# Patient Record
Sex: MALE | Race: WHITE | Employment: STUDENT | URBAN - METROPOLITAN AREA
[De-identification: names, ages, dates, MRNs, and addresses within clinical notes are randomized per-mention and may not be internally consistent; named-entity substitution may affect disease eponyms.]

---
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
## 2022-03-19 ENCOUNTER — APPOINTMENT (OUTPATIENT)
Dept: CT IMAGING | Age: 22
End: 2022-03-19
Payer: COMMERCIAL

## 2022-03-19 ENCOUNTER — HOSPITAL ENCOUNTER (OUTPATIENT)
Age: 22
Setting detail: OBSERVATION
Discharge: HOME | End: 2022-03-20
Attending: EMERGENCY MEDICINE | Admitting: SURGERY
Payer: COMMERCIAL

## 2022-03-19 ENCOUNTER — ANESTHESIA (OUTPATIENT)
Dept: OPERATING ROOM | Age: 22
End: 2022-03-19
Payer: COMMERCIAL

## 2022-03-19 ENCOUNTER — ANESTHESIA EVENT (OUTPATIENT)
Dept: OPERATING ROOM | Age: 22
End: 2022-03-19
Payer: COMMERCIAL

## 2022-03-19 VITALS — OXYGEN SATURATION: 100 % | DIASTOLIC BLOOD PRESSURE: 59 MMHG | SYSTOLIC BLOOD PRESSURE: 103 MMHG

## 2022-03-19 DIAGNOSIS — G89.18 POST-OPERATIVE PAIN: ICD-10-CM

## 2022-03-19 DIAGNOSIS — K37 APPENDICITIS, UNSPECIFIED APPENDICITIS TYPE: Primary | ICD-10-CM

## 2022-03-19 PROBLEM — K35.80 ACUTE APPENDICITIS: Status: ACTIVE | Noted: 2022-03-19

## 2022-03-19 LAB
ALBUMIN SERPL-MCNC: 4.5 G/DL (ref 3.5–5.2)
ALP BLD-CCNC: 56 U/L (ref 40–129)
ALT SERPL-CCNC: 15 U/L (ref 0–40)
ANION GAP SERPL CALCULATED.3IONS-SCNC: 11 MMOL/L (ref 7–16)
AST SERPL-CCNC: 22 U/L (ref 0–39)
BASOPHILS ABSOLUTE: 0.02 E9/L (ref 0–0.2)
BASOPHILS RELATIVE PERCENT: 0.2 % (ref 0–2)
BILIRUB SERPL-MCNC: 0.9 MG/DL (ref 0–1.2)
BILIRUBIN URINE: NEGATIVE
BLOOD, URINE: NEGATIVE
BUN BLDV-MCNC: 11 MG/DL (ref 6–20)
CALCIUM SERPL-MCNC: 9.3 MG/DL (ref 8.6–10.2)
CHLORIDE BLD-SCNC: 102 MMOL/L (ref 98–107)
CLARITY: CLEAR
CO2: 23 MMOL/L (ref 22–29)
COLOR: YELLOW
CREAT SERPL-MCNC: 0.7 MG/DL (ref 0.7–1.2)
EOSINOPHILS ABSOLUTE: 0.03 E9/L (ref 0.05–0.5)
EOSINOPHILS RELATIVE PERCENT: 0.3 % (ref 0–6)
GFR AFRICAN AMERICAN: >60
GFR NON-AFRICAN AMERICAN: >60 ML/MIN/1.73
GLUCOSE BLD-MCNC: 109 MG/DL (ref 74–99)
GLUCOSE URINE: NEGATIVE MG/DL
HCT VFR BLD CALC: 40.3 % (ref 37–54)
HEMOGLOBIN: 14.2 G/DL (ref 12.5–16.5)
IMMATURE GRANULOCYTES #: 0.04 E9/L
IMMATURE GRANULOCYTES %: 0.4 % (ref 0–5)
KETONES, URINE: NEGATIVE MG/DL
LACTIC ACID: 1.6 MMOL/L (ref 0.5–2.2)
LEUKOCYTE ESTERASE, URINE: NEGATIVE
LIPASE: 25 U/L (ref 13–60)
LYMPHOCYTES ABSOLUTE: 0.77 E9/L (ref 1.5–4)
LYMPHOCYTES RELATIVE PERCENT: 8.1 % (ref 20–42)
MCH RBC QN AUTO: 31.3 PG (ref 26–35)
MCHC RBC AUTO-ENTMCNC: 35.2 % (ref 32–34.5)
MCV RBC AUTO: 89 FL (ref 80–99.9)
MONOCYTES ABSOLUTE: 0.7 E9/L (ref 0.1–0.95)
MONOCYTES RELATIVE PERCENT: 7.3 % (ref 2–12)
NEUTROPHILS ABSOLUTE: 7.98 E9/L (ref 1.8–7.3)
NEUTROPHILS RELATIVE PERCENT: 83.7 % (ref 43–80)
NITRITE, URINE: NEGATIVE
PDW BLD-RTO: 11.6 FL (ref 11.5–15)
PH UA: 7.5 (ref 5–9)
PLATELET # BLD: 180 E9/L (ref 130–450)
PMV BLD AUTO: 10.6 FL (ref 7–12)
POTASSIUM SERPL-SCNC: 3.7 MMOL/L (ref 3.5–5)
PROTEIN UA: NEGATIVE MG/DL
RBC # BLD: 4.53 E12/L (ref 3.8–5.8)
SODIUM BLD-SCNC: 136 MMOL/L (ref 132–146)
SPECIFIC GRAVITY UA: 1.02 (ref 1–1.03)
TOTAL PROTEIN: 7.3 G/DL (ref 6.4–8.3)
UROBILINOGEN, URINE: 0.2 E.U./DL
WBC # BLD: 9.5 E9/L (ref 4.5–11.5)

## 2022-03-19 PROCEDURE — 96365 THER/PROPH/DIAG IV INF INIT: CPT

## 2022-03-19 PROCEDURE — 2500000003 HC RX 250 WO HCPCS: Performed by: NURSE ANESTHETIST, CERTIFIED REGISTERED

## 2022-03-19 PROCEDURE — 3700000000 HC ANESTHESIA ATTENDED CARE: Performed by: SURGERY

## 2022-03-19 PROCEDURE — 81003 URINALYSIS AUTO W/O SCOPE: CPT

## 2022-03-19 PROCEDURE — 6370000000 HC RX 637 (ALT 250 FOR IP): Performed by: SURGERY

## 2022-03-19 PROCEDURE — 2720000010 HC SURG SUPPLY STERILE: Performed by: SURGERY

## 2022-03-19 PROCEDURE — 7100000000 HC PACU RECOVERY - FIRST 15 MIN: Performed by: SURGERY

## 2022-03-19 PROCEDURE — 83690 ASSAY OF LIPASE: CPT

## 2022-03-19 PROCEDURE — 6360000002 HC RX W HCPCS: Performed by: NURSE ANESTHETIST, CERTIFIED REGISTERED

## 2022-03-19 PROCEDURE — 88304 TISSUE EXAM BY PATHOLOGIST: CPT

## 2022-03-19 PROCEDURE — 2580000003 HC RX 258: Performed by: NURSE ANESTHETIST, CERTIFIED REGISTERED

## 2022-03-19 PROCEDURE — 3600000014 HC SURGERY LEVEL 4 ADDTL 15MIN: Performed by: SURGERY

## 2022-03-19 PROCEDURE — 96375 TX/PRO/DX INJ NEW DRUG ADDON: CPT

## 2022-03-19 PROCEDURE — 74177 CT ABD & PELVIS W/CONTRAST: CPT

## 2022-03-19 PROCEDURE — 2500000003 HC RX 250 WO HCPCS: Performed by: PHYSICIAN ASSISTANT

## 2022-03-19 PROCEDURE — 7100000001 HC PACU RECOVERY - ADDTL 15 MIN: Performed by: SURGERY

## 2022-03-19 PROCEDURE — 3700000001 HC ADD 15 MINUTES (ANESTHESIA): Performed by: SURGERY

## 2022-03-19 PROCEDURE — 3600000004 HC SURGERY LEVEL 4 BASE: Performed by: SURGERY

## 2022-03-19 PROCEDURE — 2580000003 HC RX 258: Performed by: SURGERY

## 2022-03-19 PROCEDURE — 85025 COMPLETE CBC W/AUTO DIFF WBC: CPT

## 2022-03-19 PROCEDURE — 6360000002 HC RX W HCPCS: Performed by: PHYSICIAN ASSISTANT

## 2022-03-19 PROCEDURE — 6360000002 HC RX W HCPCS

## 2022-03-19 PROCEDURE — 80053 COMPREHEN METABOLIC PANEL: CPT

## 2022-03-19 PROCEDURE — 2709999900 HC NON-CHARGEABLE SUPPLY: Performed by: SURGERY

## 2022-03-19 PROCEDURE — G0378 HOSPITAL OBSERVATION PER HR: HCPCS

## 2022-03-19 PROCEDURE — 2580000003 HC RX 258: Performed by: PHYSICIAN ASSISTANT

## 2022-03-19 PROCEDURE — 99285 EMERGENCY DEPT VISIT HI MDM: CPT

## 2022-03-19 PROCEDURE — 2500000003 HC RX 250 WO HCPCS: Performed by: SURGERY

## 2022-03-19 PROCEDURE — 6360000004 HC RX CONTRAST MEDICATION: Performed by: RADIOLOGY

## 2022-03-19 PROCEDURE — 83605 ASSAY OF LACTIC ACID: CPT

## 2022-03-19 RX ORDER — 0.9 % SODIUM CHLORIDE 0.9 %
1000 INTRAVENOUS SOLUTION INTRAVENOUS ONCE
Status: COMPLETED | OUTPATIENT
Start: 2022-03-19 | End: 2022-03-19

## 2022-03-19 RX ORDER — LIDOCAINE HYDROCHLORIDE AND EPINEPHRINE 10; 10 MG/ML; UG/ML
INJECTION, SOLUTION INFILTRATION; PERINEURAL PRN
Status: DISCONTINUED | OUTPATIENT
Start: 2022-03-19 | End: 2022-03-19 | Stop reason: ALTCHOICE

## 2022-03-19 RX ORDER — DEXAMETHASONE SODIUM PHOSPHATE 4 MG/ML
INJECTION, SOLUTION INTRA-ARTICULAR; INTRALESIONAL; INTRAMUSCULAR; INTRAVENOUS; SOFT TISSUE PRN
Status: DISCONTINUED | OUTPATIENT
Start: 2022-03-19 | End: 2022-03-19 | Stop reason: SDUPTHER

## 2022-03-19 RX ORDER — SODIUM CHLORIDE 9 MG/ML
INJECTION, SOLUTION INTRAVENOUS CONTINUOUS PRN
Status: DISCONTINUED | OUTPATIENT
Start: 2022-03-19 | End: 2022-03-19 | Stop reason: SDUPTHER

## 2022-03-19 RX ORDER — LIDOCAINE HYDROCHLORIDE 20 MG/ML
INJECTION, SOLUTION EPIDURAL; INFILTRATION; INTRACAUDAL; PERINEURAL PRN
Status: DISCONTINUED | OUTPATIENT
Start: 2022-03-19 | End: 2022-03-19 | Stop reason: SDUPTHER

## 2022-03-19 RX ORDER — GLYCOPYRROLATE 1 MG/5 ML
SYRINGE (ML) INTRAVENOUS PRN
Status: DISCONTINUED | OUTPATIENT
Start: 2022-03-19 | End: 2022-03-19 | Stop reason: SDUPTHER

## 2022-03-19 RX ORDER — ONDANSETRON 2 MG/ML
4 INJECTION INTRAMUSCULAR; INTRAVENOUS EVERY 6 HOURS PRN
Status: DISCONTINUED | OUTPATIENT
Start: 2022-03-19 | End: 2022-03-20 | Stop reason: HOSPADM

## 2022-03-19 RX ORDER — SODIUM CHLORIDE 0.9 % (FLUSH) 0.9 %
5-40 SYRINGE (ML) INJECTION EVERY 12 HOURS SCHEDULED
Status: DISCONTINUED | OUTPATIENT
Start: 2022-03-19 | End: 2022-03-20 | Stop reason: HOSPADM

## 2022-03-19 RX ORDER — MEPERIDINE HYDROCHLORIDE 25 MG/ML
12.5 INJECTION INTRAMUSCULAR; INTRAVENOUS; SUBCUTANEOUS EVERY 5 MIN PRN
Status: DISCONTINUED | OUTPATIENT
Start: 2022-03-19 | End: 2022-03-19 | Stop reason: HOSPADM

## 2022-03-19 RX ORDER — DIPHENHYDRAMINE HYDROCHLORIDE 50 MG/ML
12.5 INJECTION INTRAMUSCULAR; INTRAVENOUS
Status: DISCONTINUED | OUTPATIENT
Start: 2022-03-19 | End: 2022-03-19 | Stop reason: HOSPADM

## 2022-03-19 RX ORDER — MIDAZOLAM HYDROCHLORIDE 1 MG/ML
INJECTION INTRAMUSCULAR; INTRAVENOUS PRN
Status: DISCONTINUED | OUTPATIENT
Start: 2022-03-19 | End: 2022-03-19 | Stop reason: SDUPTHER

## 2022-03-19 RX ORDER — ONDANSETRON 4 MG/1
4 TABLET, ORALLY DISINTEGRATING ORAL EVERY 8 HOURS PRN
Status: DISCONTINUED | OUTPATIENT
Start: 2022-03-19 | End: 2022-03-20 | Stop reason: HOSPADM

## 2022-03-19 RX ORDER — SODIUM CHLORIDE 0.9 % (FLUSH) 0.9 %
5-40 SYRINGE (ML) INJECTION PRN
Status: DISCONTINUED | OUTPATIENT
Start: 2022-03-19 | End: 2022-03-19 | Stop reason: HOSPADM

## 2022-03-19 RX ORDER — KETOROLAC TROMETHAMINE 30 MG/ML
15 INJECTION, SOLUTION INTRAMUSCULAR; INTRAVENOUS ONCE
Status: COMPLETED | OUTPATIENT
Start: 2022-03-19 | End: 2022-03-19

## 2022-03-19 RX ORDER — ROCURONIUM BROMIDE 10 MG/ML
INJECTION, SOLUTION INTRAVENOUS PRN
Status: DISCONTINUED | OUTPATIENT
Start: 2022-03-19 | End: 2022-03-19 | Stop reason: SDUPTHER

## 2022-03-19 RX ORDER — FENTANYL CITRATE 50 UG/ML
INJECTION, SOLUTION INTRAMUSCULAR; INTRAVENOUS PRN
Status: DISCONTINUED | OUTPATIENT
Start: 2022-03-19 | End: 2022-03-19 | Stop reason: SDUPTHER

## 2022-03-19 RX ORDER — KETOROLAC TROMETHAMINE 30 MG/ML
INJECTION, SOLUTION INTRAMUSCULAR; INTRAVENOUS PRN
Status: DISCONTINUED | OUTPATIENT
Start: 2022-03-19 | End: 2022-03-19 | Stop reason: SDUPTHER

## 2022-03-19 RX ORDER — MEPERIDINE HYDROCHLORIDE 25 MG/ML
INJECTION INTRAMUSCULAR; INTRAVENOUS; SUBCUTANEOUS
Status: COMPLETED
Start: 2022-03-19 | End: 2022-03-19

## 2022-03-19 RX ORDER — SODIUM CHLORIDE 0.9 % (FLUSH) 0.9 %
5-40 SYRINGE (ML) INJECTION EVERY 12 HOURS SCHEDULED
Status: DISCONTINUED | OUTPATIENT
Start: 2022-03-19 | End: 2022-03-19 | Stop reason: HOSPADM

## 2022-03-19 RX ORDER — OXYCODONE HYDROCHLORIDE 5 MG/1
5 TABLET ORAL EVERY 4 HOURS PRN
Status: DISCONTINUED | OUTPATIENT
Start: 2022-03-19 | End: 2022-03-20 | Stop reason: HOSPADM

## 2022-03-19 RX ORDER — SODIUM CHLORIDE 0.9 % (FLUSH) 0.9 %
5-40 SYRINGE (ML) INJECTION PRN
Status: DISCONTINUED | OUTPATIENT
Start: 2022-03-19 | End: 2022-03-20 | Stop reason: HOSPADM

## 2022-03-19 RX ORDER — ONDANSETRON 2 MG/ML
INJECTION INTRAMUSCULAR; INTRAVENOUS PRN
Status: DISCONTINUED | OUTPATIENT
Start: 2022-03-19 | End: 2022-03-19 | Stop reason: SDUPTHER

## 2022-03-19 RX ORDER — CEFTRIAXONE 1 G/1
INJECTION, POWDER, FOR SOLUTION INTRAMUSCULAR; INTRAVENOUS
Status: DISPENSED
Start: 2022-03-19 | End: 2022-03-20

## 2022-03-19 RX ORDER — PROPOFOL 10 MG/ML
INJECTION, EMULSION INTRAVENOUS PRN
Status: DISCONTINUED | OUTPATIENT
Start: 2022-03-19 | End: 2022-03-19 | Stop reason: SDUPTHER

## 2022-03-19 RX ORDER — SODIUM CHLORIDE 9 MG/ML
25 INJECTION, SOLUTION INTRAVENOUS PRN
Status: DISCONTINUED | OUTPATIENT
Start: 2022-03-19 | End: 2022-03-20 | Stop reason: HOSPADM

## 2022-03-19 RX ORDER — ONDANSETRON 2 MG/ML
4 INJECTION INTRAMUSCULAR; INTRAVENOUS ONCE
Status: COMPLETED | OUTPATIENT
Start: 2022-03-19 | End: 2022-03-19

## 2022-03-19 RX ORDER — NEOSTIGMINE METHYLSULFATE 1 MG/ML
INJECTION, SOLUTION INTRAVENOUS PRN
Status: DISCONTINUED | OUTPATIENT
Start: 2022-03-19 | End: 2022-03-19 | Stop reason: SDUPTHER

## 2022-03-19 RX ORDER — SODIUM CHLORIDE 9 MG/ML
25 INJECTION, SOLUTION INTRAVENOUS PRN
Status: DISCONTINUED | OUTPATIENT
Start: 2022-03-19 | End: 2022-03-19 | Stop reason: HOSPADM

## 2022-03-19 RX ADMIN — FENTANYL CITRATE 50 MCG: 50 INJECTION, SOLUTION INTRAMUSCULAR; INTRAVENOUS at 18:37

## 2022-03-19 RX ADMIN — PROPOFOL 25 MG: 10 INJECTION, EMULSION INTRAVENOUS at 18:37

## 2022-03-19 RX ADMIN — LIDOCAINE HYDROCHLORIDE 100 MG: 20 INJECTION, SOLUTION EPIDURAL; INFILTRATION; INTRACAUDAL; PERINEURAL at 18:32

## 2022-03-19 RX ADMIN — ROCURONIUM BROMIDE 40 MG: 10 INJECTION, SOLUTION INTRAVENOUS at 18:42

## 2022-03-19 RX ADMIN — CEFTRIAXONE 1000 MG: 1 INJECTION, POWDER, FOR SOLUTION INTRAMUSCULAR; INTRAVENOUS at 16:02

## 2022-03-19 RX ADMIN — KETOROLAC TROMETHAMINE 15 MG: 30 INJECTION, SOLUTION INTRAMUSCULAR; INTRAVENOUS at 12:24

## 2022-03-19 RX ADMIN — SODIUM CHLORIDE, PRESERVATIVE FREE 10 ML: 5 INJECTION INTRAVENOUS at 20:32

## 2022-03-19 RX ADMIN — OXYCODONE 5 MG: 5 TABLET ORAL at 20:31

## 2022-03-19 RX ADMIN — FENTANYL CITRATE 100 MCG: 50 INJECTION, SOLUTION INTRAMUSCULAR; INTRAVENOUS at 18:32

## 2022-03-19 RX ADMIN — METRONIDAZOLE 500 MG: 500 INJECTION, SOLUTION INTRAVENOUS at 15:45

## 2022-03-19 RX ADMIN — Medication 3 MG: at 19:06

## 2022-03-19 RX ADMIN — MEPERIDINE HYDROCHLORIDE 12.5 MG: 25 INJECTION, SOLUTION INTRAMUSCULAR; INTRAVENOUS; SUBCUTANEOUS at 19:26

## 2022-03-19 RX ADMIN — ONDANSETRON 4 MG: 2 INJECTION INTRAMUSCULAR; INTRAVENOUS at 12:23

## 2022-03-19 RX ADMIN — SODIUM CHLORIDE: 9 INJECTION, SOLUTION INTRAVENOUS at 17:51

## 2022-03-19 RX ADMIN — SODIUM CHLORIDE 1000 ML: 9 INJECTION, SOLUTION INTRAVENOUS at 12:23

## 2022-03-19 RX ADMIN — PROPOFOL 150 MG: 10 INJECTION, EMULSION INTRAVENOUS at 18:32

## 2022-03-19 RX ADMIN — ONDANSETRON 4 MG: 2 INJECTION INTRAMUSCULAR; INTRAVENOUS at 18:42

## 2022-03-19 RX ADMIN — FENTANYL CITRATE 50 MCG: 50 INJECTION, SOLUTION INTRAMUSCULAR; INTRAVENOUS at 19:19

## 2022-03-19 RX ADMIN — KETOROLAC TROMETHAMINE 30 MG: 30 INJECTION, SOLUTION INTRAMUSCULAR; INTRAVENOUS at 19:03

## 2022-03-19 RX ADMIN — MIDAZOLAM 2 MG: 1 INJECTION INTRAMUSCULAR; INTRAVENOUS at 18:24

## 2022-03-19 RX ADMIN — DEXAMETHASONE SODIUM PHOSPHATE 10 MG: 4 INJECTION, SOLUTION INTRAMUSCULAR; INTRAVENOUS at 18:42

## 2022-03-19 RX ADMIN — Medication 0.6 MG: at 19:06

## 2022-03-19 RX ADMIN — FENTANYL CITRATE 50 MCG: 50 INJECTION, SOLUTION INTRAMUSCULAR; INTRAVENOUS at 19:23

## 2022-03-19 RX ADMIN — MEPERIDINE HYDROCHLORIDE 12.5 MG: 25 INJECTION INTRAMUSCULAR; INTRAVENOUS; SUBCUTANEOUS at 19:26

## 2022-03-19 RX ADMIN — SODIUM CHLORIDE: 9 INJECTION, SOLUTION INTRAVENOUS at 19:04

## 2022-03-19 RX ADMIN — IOPAMIDOL 75 ML: 755 INJECTION, SOLUTION INTRAVENOUS at 13:41

## 2022-03-19 ASSESSMENT — PULMONARY FUNCTION TESTS
PIF_VALUE: 1
PIF_VALUE: 20
PIF_VALUE: 24
PIF_VALUE: 18
PIF_VALUE: 0
PIF_VALUE: 18
PIF_VALUE: 19
PIF_VALUE: 1
PIF_VALUE: 21
PIF_VALUE: 20
PIF_VALUE: 22
PIF_VALUE: 22
PIF_VALUE: 24
PIF_VALUE: 20
PIF_VALUE: 19
PIF_VALUE: 22
PIF_VALUE: 19
PIF_VALUE: 22
PIF_VALUE: 22
PIF_VALUE: 24
PIF_VALUE: 5
PIF_VALUE: 22
PIF_VALUE: 24
PIF_VALUE: 6
PIF_VALUE: 1
PIF_VALUE: 24
PIF_VALUE: 19
PIF_VALUE: 22
PIF_VALUE: 22
PIF_VALUE: 3
PIF_VALUE: 25
PIF_VALUE: 20
PIF_VALUE: 24
PIF_VALUE: 24
PIF_VALUE: 1
PIF_VALUE: 20
PIF_VALUE: 18
PIF_VALUE: 1
PIF_VALUE: 20
PIF_VALUE: 0
PIF_VALUE: 19
PIF_VALUE: 9
PIF_VALUE: 8
PIF_VALUE: 23
PIF_VALUE: 26
PIF_VALUE: 20
PIF_VALUE: 24
PIF_VALUE: 26
PIF_VALUE: 1
PIF_VALUE: 0
PIF_VALUE: 21
PIF_VALUE: 19

## 2022-03-19 ASSESSMENT — PAIN SCALES - GENERAL
PAINLEVEL_OUTOF10: 7
PAINLEVEL_OUTOF10: 5
PAINLEVEL_OUTOF10: 0
PAINLEVEL_OUTOF10: 0

## 2022-03-19 NOTE — OP NOTE
Operative Note      Patient: Randi Rangel  YOB: 2000  MRN: [id number]    Date of Procedure: 3/19/2022    Pre-Op Diagnosis: Acute appendicitis    Post-Op Diagnosis: same       Procedure(s):  APPENDECTOMY LAPAROSCOPIC    Surgeon(s):  Lenin Hunter MD    Assistant:   Surgical Assistant: Liban Valadez    Anesthesia: General    Estimated Blood Loss (mL): 10    Complications: none    Specimens:   ID Type Source Tests Collected by Time Destination   A : appendix Tissue Appendix SURGICAL PATHOLOGY Lenin Hunter MD 3/19/2022 1626        Implants:  * No implants in log *      Drains:   Urethral Catheter 16 fr (Active)       Indications:    25year old male with history, physical, laboratory and imaging findings consistent with acute appendicitis. Appendectomy was recommended. Risks, benefits, alternatives (and risks of alternatives) of surgery were discussed with the patient, which include but are not limited to, bleeding, infection, conversion to open, bowel injury, ureter injury, further procedures, hernia formation, risk of anesthesia, blood clots, pneumonia, heart attack and stroke. Patient understands these risk and agrees to proceed. Details of Procedure:    Patient was taken to the operating room and placed in the supine position. General anesthesia was induced. Barraza catheter was inserted. Abdomen was prepped and draped in the sterile fashion. Stab incision was made at Suárez's point and Veress needle was inserted. After confirmation of drop test, the abdomen was insufflated to 15 mmHg of CO2 gas. 1% lidocaine with epinephrine was instilled at the supraumbilical skin and a 5 mm incision was made. Initial 5 mm trocar was placed using the optiview lense. Laparoscope was reinserted, there was no injuries identfied after intial trocar placement. The following trocars were placed under direct vision:  5 mm suprapubic trocar and 12 mm LLQ trocar.  Patient was placed in Trendelenburg and right side up position. The appendix was visualized and was found to be inflamed. I was able to identify a grossly normal base of the appendix traveling into the cecum. Terminal ileum was identified and was kept out of harms way. Next a [place] grasper was used to create a window at a clear area at the base of the appendix. A 45 linear cutting stapler was used to staple and divide the appendix at its base. LigaSure was then used to divide the mesoappendix. The appendix was placed in an endoscopic retrieval bag and sent to pathology. The right lower quadrant was then suctioned out with suction . There was venous bleeding from the mesoappendix and a 5 mm clip applier was used for hemostasis. After doing so hemostasis was excellent at the cecal staple line and mesentery. Next I turned my attention to closure. the left lower quadrant trocar was closed using 0 Vicryl strand on a suture passer under direct vision laparoscopically. The trocars were checked for bleeding which none was present. Abdomen was desufflated, trocars were removed. All incisions were then closed using a 4-0 monocryl subcuticular stitch. All incisions were cleaned and dried and Dermabond and sterile dressings were applied. All sponge, needle and instrumentation counts were correct at the end of the procedure. Barraza catheter was removed at the end of the procedure. Patient tolerated the procedure well and was taken to PACU in stable condition.

## 2022-03-19 NOTE — ANESTHESIA PRE PROCEDURE
Department of Anesthesiology  Preprocedure Note       Name:  Cindy Chong   Age:  25 y.o.  :  2000                                          MRN:  [id number]         Date:  3/19/2022      Surgeon: * No surgeons listed *    Procedure: * No procedures listed *    Medications prior to admission:   Prior to Admission medications    Not on File       Current medications:    Current Facility-Administered Medications   Medication Dose Route Frequency Provider Last Rate Last Admin    cefTRIAXone (ROCEPHIN) 1,000 mg in sterile water 10 mL IV syringe  1,000 mg IntraVENous Once Mikki Cabral PA-C        metronidazole (FLAGYL) 500 mg in NaCl 100 mL IVPB premix  500 mg IntraVENous Once Mikki Cabral PA-C         No current outpatient medications on file. Allergies:  No Known Allergies    Problem List:  There is no problem list on file for this patient. Past Medical History:  No past medical history on file. Past Surgical History:  No past surgical history on file. Social History:    Social History     Tobacco Use    Smoking status: Not on file    Smokeless tobacco: Not on file   Substance Use Topics    Alcohol use: Not on file                                Counseling given: Not Answered      Vital Signs (Current):   Vitals:    22 1201   BP: 122/69   Pulse: 77   Resp: 14   Temp: 97.9 °F (36.6 °C)   TempSrc: Temporal   SpO2: 97%   Weight: 132 lb (59.9 kg)   Height: 5' 8\" (1.727 m)                                              BP Readings from Last 3 Encounters:   22 122/69   21 119/64       NPO Status:                                                                                 BMI:   Wt Readings from Last 3 Encounters:   22 132 lb (59.9 kg) (14 %, Z= -1.06)*   21 140 lb (63.5 kg) (32 %, Z= -0.47)*     * Growth percentiles are based on CDC (Boys, 2-20 Years) data. Body mass index is 20.07 kg/m².     CBC:   Lab Results   Component Value Date    WBC 9.5 03/19/2022    RBC 4.53 03/19/2022    HGB 14.2 03/19/2022    HCT 40.3 03/19/2022    MCV 89.0 03/19/2022    RDW 11.6 03/19/2022     03/19/2022       CMP:   Lab Results   Component Value Date     03/19/2022    K 3.7 03/19/2022     03/19/2022    CO2 23 03/19/2022    BUN 11 03/19/2022    CREATININE 0.7 03/19/2022    GFRAA >60 03/19/2022    LABGLOM >60 03/19/2022    GLUCOSE 109 03/19/2022    PROT 7.3 03/19/2022    CALCIUM 9.3 03/19/2022    BILITOT 0.9 03/19/2022    ALKPHOS 56 03/19/2022    AST 22 03/19/2022    ALT 15 03/19/2022       POC Tests: No results for input(s): POCGLU, POCNA, POCK, POCCL, POCBUN, POCHEMO, POCHCT in the last 72 hours. Coags: No results found for: PROTIME, INR, APTT    HCG (If Applicable): No results found for: PREGTESTUR, PREGSERUM, HCG, HCGQUANT     ABGs: No results found for: PHART, PO2ART, OQD3MJE, WHP6PGG, BEART, P7XSBSMH     Type & Screen (If Applicable):  No results found for: LABABO, LABRH    Drug/Infectious Status (If Applicable):  No results found for: HIV, HEPCAB    COVID-19 Screening (If Applicable): No results found for: COVID19        Anesthesia Evaluation  Patient summary reviewed no history of anesthetic complications:   Airway: Mallampati: I     Neck ROM: full  Mouth opening: > = 3 FB Dental:          Pulmonary:Negative Pulmonary ROS breath sounds clear to auscultation                             Cardiovascular:Negative CV ROS            Rhythm: regular  Rate: normal           Beta Blocker:  Not on Beta Blocker         Neuro/Psych:   Negative Neuro/Psych ROS              GI/Hepatic/Renal:            ROS comment: Acute appendicitis  N/V. Endo/Other: Negative Endo/Other ROS                    Abdominal:             Vascular: negative vascular ROS. Other Findings:           Anesthesia Plan      general     ASA 2 - emergent     (Glidescope)  Induction: intravenous.     MIPS: Postoperative opioids intended and Prophylactic antiemetics

## 2022-03-19 NOTE — H&P
General Surgery Consult    Patient's Name/Date of Birth: Cindy Chong / 2000    Date: March 19, 2022     Consulting Surgeon: Danuta Escoto M.D.    PCP: No primary care provider on file. Chief Complaint: Right lower quadrant pain    HPI:   Cindy Chong is a 25 y.o. male who presents for evaluation of right lower quadrant pain. Patient states that this pain started last night around the periumbilical area. It has radiated to the right lower quadrant. No change in bowel habits, nausea, vomiting. No fevers, chills, chest pain, shortness of breath, cough      No past medical history on file. No past surgical history on file. Current Facility-Administered Medications   Medication Dose Route Frequency Provider Last Rate Last Admin    cefTRIAXone (ROCEPHIN) 1 g injection                No Known Allergies    No family history on file. Social History     Socioeconomic History    Marital status: Single     Spouse name: Not on file    Number of children: Not on file    Years of education: Not on file    Highest education level: Not on file   Occupational History    Not on file   Tobacco Use    Smoking status: Not on file    Smokeless tobacco: Not on file   Substance and Sexual Activity    Alcohol use: Not on file    Drug use: Not on file    Sexual activity: Not on file   Other Topics Concern    Not on file   Social History Narrative    Not on file     Social Determinants of Health     Financial Resource Strain:     Difficulty of Paying Living Expenses: Not on file   Food Insecurity:     Worried About Running Out of Food in the Last Year: Not on file    Castillo of Food in the Last Year: Not on file   Transportation Needs:     Lack of Transportation (Medical): Not on file    Lack of Transportation (Non-Medical):  Not on file   Physical Activity:     Days of Exercise per Week: Not on file    Minutes of Exercise per Session: Not on file   Stress:     Feeling of Stress : Not on file   Social Connections:     Frequency of Communication with Friends and Family: Not on file    Frequency of Social Gatherings with Friends and Family: Not on file    Attends [employer] Services: Not on file    Active Member of Clubs or Organizations: Not on file    Attends Club or Organization Meetings: Not on file    Marital Status: Not on file   Intimate Partner Violence:     Fear of Current or Ex-Partner: Not on file    Emotionally Abused: Not on file    Physically Abused: Not on file    Sexually Abused: Not on file   Housing Stability:     Unable to Pay for Housing in the Last Year: Not on file    Number of [place] in the Last Year: Not on file    Unstable Housing in the Last Year: Not on file           Review of Systems  Negative times ten except what was stated in HPI and PMH    Physical exam:   /64   Pulse 60   Temp 98.4 °F (36.9 °C) (Oral)   Resp 14   Ht 5' 8\" (1.727 m)   Wt 132 lb (59.9 kg)   SpO2 99%   BMI 20.07 kg/m²   General appearance: AAOx3, NAD  Head: NCAT, PERRLA, EOMI, red conjunctiva  Neck: supple, no masses  Lungs: CTAB, equal chest rise bilateral  Heart: Reg rate  Abdomen: soft, tender to palpation periumbilical and right lower quadrant, no guarding/rebound, nondistended, no palpable hernias or defects  Skin: no lesions  Gu: no cva tenderness  Extremities: extremities normal, atraumatic, no cyanosis or edema    Labs:  [facility] (MRN [id number]) as of 3/19/2022 17:57   Ref.  Range 3/19/2022 12:13   Sodium Latest Ref Range: 132 - 146 mmol/L 136   Potassium Latest Ref Range: 3.5 - 5.0 mmol/L 3.7   Chloride Latest Ref Range: 98 - 107 mmol/L 102   CO2 Latest Ref Range: 22 - 29 mmol/L 23   BUN,BUNPL Latest Ref Range: 6 - 20 mg/dL 11   Creatinine Latest Ref Range: 0.7 - 1.2 mg/dL 0.7   Anion Gap Latest Ref Range: 7 - 16 mmol/L 11   GFR Non- Latest Ref Range: >=60 mL/min/1.73 >60   GFR African American Unknown >60   Lactic Acid Latest Ref Range: 0.5 - 2.2 mmol/L 1.6   GLUCOSE, FASTING,GF Latest Ref Range: 74 - 99 mg/dL 109 (H)   CALCIUM, SERUM, [id number] Latest Ref Range: 8.6 - 10.2 mg/dL 9.3   Total Protein Latest Ref Range: 6.4 - 8.3 g/dL 7.3   Albumin Latest Ref Range: 3.5 - 5.2 g/dL 4.5   Alk Phos Latest Ref Range: 40 - 129 U/L 56   ALT Latest Ref Range: 0 - 40 U/L 15   AST Latest Ref Range: 0 - 39 U/L 22   Bilirubin Latest Ref Range: 0.0 - 1.2 mg/dL 0.9   Lipase Latest Ref Range: 13 - 60 U/L 25   WBC Latest Ref Range: 4.5 - 11.5 E9/L 9.5   RBC Latest Ref Range: 3.80 - 5.80 E12/L 4.53   Hemoglobin Quant Latest Ref Range: 12.5 - 16.5 g/dL 14.2   Hematocrit Latest Ref Range: 37.0 - 54.0 % 40.3   MCV Latest Ref Range: 80.0 - 99.9 fL 89.0   MCH Latest Ref Range: 26.0 - 35.0 pg 31.3   MCHC Latest Ref Range: 32.0 - 34.5 % 35.2 (H)   MPV Latest Ref Range: 7.0 - 12.0 fL 10.6   RDW Latest Ref Range: 11.5 - 15.0 fL 11.6   Platelet Count Latest Ref Range: 130 - 450 E9/L 180   Neutrophils % Latest Ref Range: 43.0 - 80.0 % 83.7 (H)   Immature Granulocytes % Latest Ref Range: 0.0 - 5.0 % 0.4   Lymphocyte % Latest Ref Range: 20.0 - 42.0 % 8.1 (L)   Monocytes % Latest Ref Range: 2.0 - 12.0 % 7.3   Eosinophils % Latest Ref Range: 0.0 - 6.0 % 0.3   Basophils % Latest Ref Range: 0.0 - 2.0 % 0.2   Neutrophils Absolute Latest Ref Range: 1.80 - 7.30 E9/L 7.98 (H)   Immature Granulocytes # Latest Units: E9/L 0.04   Lymphocytes Absolute Latest Ref Range: 1.50 - 4.00 E9/L 0.77 (L)   Monocytes Absolute Latest Ref Range: 0.10 - 0.95 E9/L 0.70   Eosinophils Absolute Latest Ref Range: 0.05 - 0.50 E9/L 0.03 (L)   Basophils Absolute Latest Ref Range: 0.00 - 0.20 E9/L 0.02   Color, UA Latest Ref Range: Straw/Yellow  Yellow   Clarity, UA Latest Ref Range: Clear  Clear   Glucose, UA Latest Ref Range: Negative mg/dL Negative   Bilirubin, Urine Latest Ref Range: Negative  Negative   Ketones, Urine Latest Ref Range: Negative mg/dL Negative   Specific Gravity, UA Latest Ref Range: 1.005 - 1.030  1.020   Blood, Urine Latest Ref Range: Negative  Negative   pH, UA Latest Ref Range: 5.0 - 9.0  7.5   Protein, UA Latest Ref Range: Negative mg/dL Negative   Urobilinogen, Urine Latest Ref Range: <2.0 E.U./dL 0.2   Nitrite, Urine Latest Ref Range: Negative  Negative   Leukocyte Esterase, Urine Latest Ref Range: Negative  Negative       Radiology:  CT abdomen/pelvis:   Impression   1.  Findings compatible with early mild acute appendicitis.  The appendix is   located in the pelvis just to the right of the midline posterior to the iliac   vessels measuring 9 mm in maximal diameter.  Small amount of free pelvic   fluid but no evidence of abscess.       2.  No other acute intra-process detected. Assessment:  25 y.o. male with acute appendicitis    Plan:  Recommend appendectomy. Risks, benefits, alteratives (and risks of alternatives) of the procedure were discussed with patient at bedside, all questions answered. He understands and agrees to proceed.       Mikala Rivera MD  3/19/2022  5:57 PM

## 2022-03-19 NOTE — ED PROVIDER NOTES
ED Attending  CC: [place]         [facility]  Department of Emergency Medicine   ED  Encounter Note  Admit Date/RoomTime: 3/19/2022 12:00 PM  ED Room:     NAME: Jerel Perez  : 2000  MRN: [id number]     Chief Complaint:  Abdominal Pain (started 0800), Emesis, and Nausea    History of Present Illness        Jerel Perez is a 25 y.o. old male who presents to the emergency department by private vehicle, for persistent abdominal pain and vomiting since last night. Patient states his symptoms are mild in severity describes as an aching pain. Patient denies anything making it better or worse. Patient denies previous abdominal surgery. Patient denies drug use. Denies fever/chills, headache, vision change, dizziness, cough, hemoptysis, chest pain, dyspnea, diarrhea, hematemesis, melena, bloody stool, urinary symptoms, hematuria, numbness/weakness. ROS   Pertinent positives and negatives are stated within HPI, all other systems reviewed and are negative. Past Medical History:  has no past medical history on file. Surgical History:  has no past surgical history on file. Social History:      Family History: family history is not on file. Allergies: Patient has no known allergies. Physical Exam   Oxygen Saturation Interpretation: Normal.        ED Triage Vitals [22 1201]   BP Temp Temp Source Heart Rate Resp SpO2 Height Weight   122/69 97.9 °F (36.6 °C) Temporal 77 14 97 % 5' 8\" (1.727 m) 132 lb (59.9 kg)       Physical Exam  General Appearance/Constitutional:  Alert, development consistent with age. HEENT:  NC/NT. PERRLA. Airway patent. Neck:  Supple. No lymphadenopathy. Respiratory: Lungs Clear to auscultation and breath sounds equal.  CV:  Regular rate and rhythm. GI:  normal appearing, non-distended with no visible hernias. Bowel sounds: normal bowel sounds. Tenderness:  There is mild tenderness present - located in the periumbilical area.          Liver: non-tender. Spleen:  non-tender. Back: CVA Tenderness: No CVA tenderness. Integument:  Normal turgor. Warm, dry, without visible rash, unless noted elsewhere. Neurological:  Orientation age-appropriate. Motor functions intact.     Lab / Imaging Results   (All laboratory and radiology results have been personally reviewed by myself)  Labs:  Results for orders placed or performed during the hospital encounter of 03/19/22   Urinalysis   Result Value Ref Range    Color, UA Yellow Straw/Yellow    Clarity, UA Clear Clear    Glucose, Ur Negative Negative mg/dL    Bilirubin Urine Negative Negative    Ketones, Urine Negative Negative mg/dL    Specific Gravity, UA 1.020 1.005 - 1.030    Blood, Urine Negative Negative    pH, UA 7.5 5.0 - 9.0    Protein, UA Negative Negative mg/dL    Urobilinogen, Urine 0.2 <2.0 E.U./dL    Nitrite, Urine Negative Negative    Leukocyte Esterase, Urine Negative Negative   CBC with Auto Differential   Result Value Ref Range    WBC 9.5 4.5 - 11.5 E9/L    RBC 4.53 3.80 - 5.80 E12/L    Hemoglobin 14.2 12.5 - 16.5 g/dL    Hematocrit 40.3 37.0 - 54.0 %    MCV 89.0 80.0 - 99.9 fL    MCH 31.3 26.0 - 35.0 pg    MCHC 35.2 (H) 32.0 - 34.5 %    RDW 11.6 11.5 - 15.0 fL    Platelets 815 040 - 074 E9/L    MPV 10.6 7.0 - 12.0 fL    Neutrophils % 83.7 (H) 43.0 - 80.0 %    Immature Granulocytes % 0.4 0.0 - 5.0 %    Lymphocytes % 8.1 (L) 20.0 - 42.0 %    Monocytes % 7.3 2.0 - 12.0 %    Eosinophils % 0.3 0.0 - 6.0 %    Basophils % 0.2 0.0 - 2.0 %    Neutrophils Absolute 7.98 (H) 1.80 - 7.30 E9/L    Immature Granulocytes # 0.04 E9/L    Lymphocytes Absolute 0.77 (L) 1.50 - 4.00 E9/L    Monocytes Absolute 0.70 0.10 - 0.95 E9/L    Eosinophils Absolute 0.03 (L) 0.05 - 0.50 E9/L    Basophils Absolute 0.02 0.00 - 0.20 E9/L   Comprehensive Metabolic Panel   Result Value Ref Range    Sodium 136 132 - 146 mmol/L    Potassium 3.7 3.5 - 5.0 mmol/L    Chloride 102 98 - 107 mmol/L    CO2 23 22 - 29 mmol/L    Anion Gap 11 7 - 16 mmol/L    Glucose 109 (H) 74 - 99 mg/dL    BUN 11 6 - 20 mg/dL    CREATININE 0.7 0.7 - 1.2 mg/dL    GFR Non-African American >60 >=60 mL/min/1.73    GFR African American >60     Calcium 9.3 8.6 - 10.2 mg/dL    Total Protein 7.3 6.4 - 8.3 g/dL    Albumin 4.5 3.5 - 5.2 g/dL    Total Bilirubin 0.9 0.0 - 1.2 mg/dL    Alkaline Phosphatase 56 40 - 129 U/L    ALT 15 0 - 40 U/L    AST 22 0 - 39 U/L   Lipase   Result Value Ref Range    Lipase 25 13 - 60 U/L   Lactic Acid   Result Value Ref Range    Lactic Acid 1.6 0.5 - 2.2 mmol/L     Imaging: All Radiology results interpreted by Radiologist unless otherwise noted. CT ABDOMEN PELVIS W IV CONTRAST Additional Contrast? None   Final Result   1. Findings compatible with early mild acute appendicitis. The appendix is   located in the pelvis just to the right of the midline posterior to the iliac   vessels measuring 9 mm in maximal diameter. Small amount of free pelvic   fluid but no evidence of abscess. 2.  No other acute intra-process detected.              ED Course / Medical Decision Making     Medications   cefTRIAXone (ROCEPHIN) 1 g injection (has no administration in time range)   ketorolac (TORADOL) injection 15 mg (15 mg IntraVENous Given 3/19/22 1224)   ondansetron (ZOFRAN) injection 4 mg (4 mg IntraVENous Given 3/19/22 1223)   0.9 % sodium chloride bolus (0 mLs IntraVENous Stopped 3/19/22 1350)   iopamidol (ISOVUE-370) 76 % injection 75 mL (75 mLs IntraVENous Given 3/19/22 1341)   cefTRIAXone (ROCEPHIN) 1,000 mg in sterile water 10 mL IV syringe (1,000 mg IntraVENous Given 3/19/22 1602)   metronidazole (FLAGYL) 500 mg in NaCl 100 mL IVPB premix (0 mg IntraVENous Stopped 3/19/22 1644)     ED Course as of 03/19/22 1717   Sat Mar 19, 2022   1316 Patient asleep, resting comfortably [KL]      ED Course User Index  [KL] IRMA Haley         Consultations:             IP CONSULT TO GENERAL SURGERY    Procedures:   none    MDM: Patient presenting with abdominal pain. Patient is in no acute distress, afebrile, nontoxic appearance. Patient's labs are stable. Patient CT showing early mild acute appendicitis with no evidence of abscess at this time. Rocephin and Flagyl given in the ED. Spoke with Dr. James Espinoza who will take patient to OR from ED. Plan of Care/Counseling:  IRMA Haley  reviewed today's visit with the patient and mother in addition to providing specific details for the plan of care and counseling regarding the diagnosis and prognosis. Questions are answered at this time and are agreeable with the plan. Assessment      1. Appendicitis, unspecified appendicitis type      This patient's ED course included: a personal history and physicial examination and multiple bedside re-evaluations  This patient has remained hemodynamically stable during their ED course. Plan   Transferred to operating room  Patient condition is stable. New Medications     New Prescriptions    No medications on file     Electronically signed by IRMA Haley   DD: 3/19/22  **This report was transcribed using voice recognition software. Every effort was made to ensure accuracy; however, inadvertent computerized transcription errors may be present.   END OF PROVIDER NOTE     IRMA Haley  03/19/22 0234

## 2022-03-19 NOTE — ANESTHESIA POSTPROCEDURE EVALUATION
Department of Anesthesiology  Postprocedure Note    Patient: Rene Velasqeuz  MRN: [id number]  [place]: 2000  Date of evaluation: 3/19/2022  Time:  7:33 PM     Procedure Summary     Date: 03/19/22 Room / Location: [facility] 01 / [facility]    Anesthesia Start: 8703 Anesthesia Stop: 1924    Procedures:       APPENDECTOMY LAPAROSCOPIC (N/A Abdomen)      Procedure Not Yet Scheduled Diagnosis: (abd pain)    Surgeons: Hayden Morrow MD Responsible Provider: Marimar Charles MD    Anesthesia Type: general ASA Status: 2 - Emergent          Anesthesia Type: general    Awilda Phase I: Awilda Score: 9    Awilda Phase II:      Last vitals: Reviewed and per EMR flowsheets.        Anesthesia Post Evaluation    Patient location during evaluation: PACU  Patient participation: complete - patient participated  Level of consciousness: awake and alert  Pain score: 0  Airway patency: patent  Nausea & Vomiting: no vomiting and no nausea  Complications: no  Cardiovascular status: hemodynamically stable  Respiratory status: acceptable and spontaneous ventilation  Hydration status: stable  Multimodal analgesia pain management approach

## 2022-03-20 VITALS
HEART RATE: 54 BPM | BODY MASS INDEX: 21.17 KG/M2 | TEMPERATURE: 97.8 F | OXYGEN SATURATION: 100 % | DIASTOLIC BLOOD PRESSURE: 59 MMHG | RESPIRATION RATE: 19 BRPM | SYSTOLIC BLOOD PRESSURE: 105 MMHG | WEIGHT: 139.7 LBS | HEIGHT: 68 IN

## 2022-03-20 LAB
ANION GAP SERPL CALCULATED.3IONS-SCNC: 12 MMOL/L (ref 7–16)
BUN BLDV-MCNC: 10 MG/DL (ref 6–20)
CALCIUM SERPL-MCNC: 8.9 MG/DL (ref 8.6–10.2)
CHLORIDE BLD-SCNC: 102 MMOL/L (ref 98–107)
CO2: 22 MMOL/L (ref 22–29)
CREAT SERPL-MCNC: 0.8 MG/DL (ref 0.7–1.2)
GFR AFRICAN AMERICAN: >60
GFR NON-AFRICAN AMERICAN: >60 ML/MIN/1.73
GLUCOSE BLD-MCNC: 131 MG/DL (ref 74–99)
HCT VFR BLD CALC: 37 % (ref 37–54)
HEMOGLOBIN: 13 G/DL (ref 12.5–16.5)
MCH RBC QN AUTO: 31.6 PG (ref 26–35)
MCHC RBC AUTO-ENTMCNC: 35.1 % (ref 32–34.5)
MCV RBC AUTO: 89.8 FL (ref 80–99.9)
PDW BLD-RTO: 11.5 FL (ref 11.5–15)
PLATELET # BLD: 163 E9/L (ref 130–450)
PMV BLD AUTO: 10.6 FL (ref 7–12)
POTASSIUM SERPL-SCNC: 4.4 MMOL/L (ref 3.5–5)
RBC # BLD: 4.12 E12/L (ref 3.8–5.8)
SODIUM BLD-SCNC: 136 MMOL/L (ref 132–146)
WBC # BLD: 6.9 E9/L (ref 4.5–11.5)

## 2022-03-20 PROCEDURE — 2580000003 HC RX 258: Performed by: SURGERY

## 2022-03-20 PROCEDURE — 6370000000 HC RX 637 (ALT 250 FOR IP): Performed by: SURGERY

## 2022-03-20 PROCEDURE — 85027 COMPLETE CBC AUTOMATED: CPT

## 2022-03-20 PROCEDURE — G0378 HOSPITAL OBSERVATION PER HR: HCPCS

## 2022-03-20 PROCEDURE — 36415 COLL VENOUS BLD VENIPUNCTURE: CPT

## 2022-03-20 PROCEDURE — 80048 BASIC METABOLIC PNL TOTAL CA: CPT

## 2022-03-20 RX ORDER — HYDROCODONE BITARTRATE AND ACETAMINOPHEN 5; 325 MG/1; MG/1
1 TABLET ORAL EVERY 6 HOURS PRN
Qty: 20 TABLET | Refills: 0 | Status: SHIPPED | OUTPATIENT
Start: 2022-03-20 | End: 2022-03-27

## 2022-03-20 RX ADMIN — OXYCODONE 5 MG: 5 TABLET ORAL at 12:17

## 2022-03-20 RX ADMIN — SODIUM CHLORIDE, PRESERVATIVE FREE 10 ML: 5 INJECTION INTRAVENOUS at 08:14

## 2022-03-20 RX ADMIN — OXYCODONE 5 MG: 5 TABLET ORAL at 00:32

## 2022-03-20 RX ADMIN — OXYCODONE 5 MG: 5 TABLET ORAL at 08:13

## 2022-03-20 ASSESSMENT — PAIN SCALES - GENERAL
PAINLEVEL_OUTOF10: 5
PAINLEVEL_OUTOF10: 7
PAINLEVEL_OUTOF10: 5
PAINLEVEL_OUTOF10: 0

## 2022-03-20 ASSESSMENT — PAIN DESCRIPTION - PAIN TYPE: TYPE: SURGICAL PAIN

## 2022-03-20 ASSESSMENT — PAIN DESCRIPTION - FREQUENCY: FREQUENCY: CONTINUOUS

## 2022-03-20 ASSESSMENT — PAIN DESCRIPTION - ORIENTATION: ORIENTATION: RIGHT;LEFT

## 2022-03-20 ASSESSMENT — PAIN DESCRIPTION - ONSET: ONSET: ON-GOING

## 2022-03-20 ASSESSMENT — PAIN DESCRIPTION - LOCATION: LOCATION: ABDOMEN

## 2022-03-20 ASSESSMENT — PAIN DESCRIPTION - PROGRESSION: CLINICAL_PROGRESSION: NOT CHANGED

## 2022-03-20 ASSESSMENT — PAIN - FUNCTIONAL ASSESSMENT: PAIN_FUNCTIONAL_ASSESSMENT: ACTIVITIES ARE NOT PREVENTED

## 2022-03-20 ASSESSMENT — PAIN DESCRIPTION - DESCRIPTORS: DESCRIPTORS: ACHING;DISCOMFORT

## (undated) DEVICE — DRAPE,CHEST,FENES,15X10,STERIL: Brand: MEDLINE

## (undated) DEVICE — INSUFFLATION TUBING SET WITH FILTER, FUNNEL CONNECTOR AND LUER LOCK: Brand: JOSNOE MEDICAL INC

## (undated) DEVICE — INSUFFLATION NEEDLE TO ESTABLISH PNEUMOPERITONEUM.: Brand: INSUFFLATION NEEDLE

## (undated) DEVICE — NEEDLE HYPO 25GA L1.5IN BLU POLYPR HUB S STL REG BVL STR

## (undated) DEVICE — APPLIER CLP M/L SHFT DIA5MM 15 LIG LIGAMAX 5

## (undated) DEVICE — COVER HNDL LT DISP

## (undated) DEVICE — TOWEL,OR,DSP,ST,BLUE,STD,6/PK,12PK/CS: Brand: MEDLINE

## (undated) DEVICE — ELECTRODE PT RET AD L9FT HI MOIST COND ADH HYDRGEL CORDED

## (undated) DEVICE — RELOAD STPL L45MM H1.5-3.6MM REG TISS BLU GRIPPING SURF B

## (undated) DEVICE — INSTRUMENT CLAMP TOWEL LARGE REUSABLE

## (undated) DEVICE — 4-PORT MANIFOLD: Brand: NEPTUNE 2

## (undated) DEVICE — INTENDED FOR TISSUE SEPARATION, AND OTHER PROCEDURES THAT REQUIRE A SHARP SURGICAL BLADE TO PUNCTURE OR CUT.: Brand: BARD-PARKER ® STAINLESS STEEL BLADES

## (undated) DEVICE — GOWN,SIRUS,FABRNF,XL,20/CS: Brand: MEDLINE

## (undated) DEVICE — STAPLER ENDOSCP 45MM L34CM STD NAT ARTC LIN CUT ECHELON FLX

## (undated) DEVICE — TOTAL TRAY, DB, 100% SILI FOLEY, 16FR 10: Brand: MEDLINE

## (undated) DEVICE — KIT,ANTI FOG,W/SPONGE & FLUID,SOFT PACK: Brand: MEDLINE

## (undated) DEVICE — TROCAR: Brand: KII FIOS FIRST ENTRY

## (undated) DEVICE — NEEDLE CLOSURE OMNICLOSE

## (undated) DEVICE — CHLORAPREP 26ML ORANGE

## (undated) DEVICE — SOLUTION IV IRRIG POUR BRL 0.9% SODIUM CHL 2F7124

## (undated) DEVICE — GLOVE SURG SZ 75 L12IN FNGR THK94MIL TRNSLUC YEL LTX

## (undated) DEVICE — GLOVE SURG SZ 8 L12IN FNGR THK94MIL TRNSLUC YEL LTX HYDRGEL

## (undated) DEVICE — CAMERA STRYKER 1488

## (undated) DEVICE — ADHESIVE SKIN CLSR 0.7ML TOP DERMBND ADV

## (undated) DEVICE — BLADE CLIPPER GEN PURP NS

## (undated) DEVICE — SEALER ENDOSCP L37CM NANO COAT BLNT TIP LAP DIV

## (undated) DEVICE — TISSUE RETRIEVAL SYSTEM: Brand: INZII RETRIEVAL SYSTEM

## (undated) DEVICE — TROCAR: Brand: KII® SLEEVE

## (undated) DEVICE — SET LAPAROSCOPIC HERNIA

## (undated) DEVICE — PACK PROCEDURE SURG GEN CUST

## (undated) DEVICE — DOUBLE BASIN SET: Brand: MEDLINE INDUSTRIES, INC.

## (undated) DEVICE — SYRINGE 20ML LL S/C 50

## (undated) DEVICE — SET INSTRUMENT LAP I